# Patient Record
Sex: MALE | Race: WHITE | Employment: OTHER | URBAN - METROPOLITAN AREA
[De-identification: names, ages, dates, MRNs, and addresses within clinical notes are randomized per-mention and may not be internally consistent; named-entity substitution may affect disease eponyms.]

---

## 2017-05-30 ENCOUNTER — HOSPITAL ENCOUNTER (OUTPATIENT)
Dept: GENERAL RADIOLOGY | Age: 77
Discharge: HOME | End: 2017-05-30
Attending: FAMILY MEDICINE
Payer: MEDICARE

## 2017-05-30 DIAGNOSIS — R13.10 DIFFICULTY IN SWALLOWING: ICD-10-CM

## 2017-05-30 DIAGNOSIS — R05.9 COUGH: ICD-10-CM

## 2017-05-30 DIAGNOSIS — R13.13 PHARYNGEAL DYSPHAGIA: ICD-10-CM

## 2017-05-30 PROCEDURE — 74230 X-RAY XM SWLNG FUNCJ C+: CPT

## 2017-05-30 PROCEDURE — G8996 SWALLOW CURRENT STATUS: HCPCS

## 2017-05-30 PROCEDURE — G8997 SWALLOW GOAL STATUS: HCPCS

## 2017-05-30 PROCEDURE — G8998 SWALLOW D/C STATUS: HCPCS

## 2017-05-30 PROCEDURE — 92611 MOTION FLUOROSCOPY/SWALLOW: CPT

## 2017-05-30 NOTE — PROGRESS NOTES
[address] [place] [address]    Speech Pathology Modified barium swallow Study  Patient: Yenni Merida (89 y.o. male)  Date: 5/30/2017  Referring Provider:  Dr. Hendrick Kocher:   Patient alert. Patient reports gagging/feeling like he is drowning on his own saliva. Reports it feels like saliva goes down his windpipe instead of his esophagus. Reports this occurs daily, and has been occurring \"for a while\" but is more common lately. Reports this rarely happens with PO intake. PMH includes chest pain and arm numbness recently with WNL EKG x2 with ultimate stent placed due to 85% blockage per patient report. Also with hypothermia and reported heart attack, however patient does not believe he had a heart attack. 15-20 years ago, patient with throat spasms which subsided with stretching esophagus and medication, however patient does not remember the name of the medication (he still takes it). Also reports PNA 30-35 years ago. Reports no difficulty with pills. Also reports globus sensation when pointing laterally around the thyroid notch that will \"come and go\" and is not alleviated by anything. OBJECTIVE:   Past Medical History:   No past medical history on file. No past surgical history on file. Current Dietary Status:  Regular/thin liquid  Radiologist: Dr. Aaron Mc: Lateral;Fluoro  Patient Position: standing upright    Trial 1:   Consistency Presented: Thin liquid;Puree; Solid;Pill/Tablet   How Presented: Self-fed/presented;Spoon;Cup/sip;Cup/gulp;Straw;Successive swallows       Bolus Acceptance: No impairment   Bolus Formation/Control: No impairment:     Propulsion: No impairment   Oral Residue: None   Initiation of Swallow: Triggered at vallecula (spillage to pyriform sinuses)   Timing: Pooling 1-5 sec (~1 second)   Penetration: None   Aspiration/Timing: No evidence of aspiration   Pharyngeal Clearance: Vallecular residue;Pyriform residue ; Less than 10%   Attempted Modifications: Double swallow   Effective Modifications: Double swallow   Cues for Modifications: None           Decreased Tongue Base Retraction?: Yes  Laryngeal Elevation: Inadequate epiglottic inversion  Aspiration/Penetration Score: 1 (No penetration or aspiration-Contrast does not enter the airway)  Pharyngeal Symmetry: Not assessed  Pharyngeal-Esophageal Segment: Decreased relaxation of upper esophageal segment  Pharyngeal Dysfunction: Crico-pharyngeal dysfunction;Decreased tongue base retraction;Decreased strength    Oral Phase Severity: No impairment  Pharyngeal Phase Severity: Mild (but functional)     In compliance with CMSs Claims Based Outcome Reporting, the following G-code set was chosen for this patient based the use of the NOMS functional outcome to quantify this patient's level of swallowing impairment. Using the NOMS, the patient was determined to be at level 6 for swallow function which correlates with the CI= 1-19% level of severity. Based on the objective assessment provided within this note, the current, goal, and discharge g-codes are as follows:    Swallow  Swallowing:   Swallow Current Status CI= 1-19%   Swallow Goal Status CI= 1-19%   Swallow D/C Status CI= 1-19%        NOMS Swallowing Levels:  Level 1 (CN): NPO  Level 2 (CM): NPO but takes consistency in therapy  Level 3 (CL): Takes less than 50% of nutrition p.o. and continues with nonoral feedings; and/or safe with mod cues; and/or max diet restriction  Level 4 (CK): Safe swallow but needs mod cues; and/or mod diet restriction; and/or still requires some nonoral feeding/supplements  Level 5 (CJ): Safe swallow with min diet restriction; and/or needs min cues  Level 6 (CI): Independent with p.o.; rare cues; usually self cues; may need to avoid some foods or needs extra time  Level 7 ([address] [place] [address]): Independent for all p.o.  MELITA. (2003).  National Outcomes Measurement System (NOMS): Adult Speech-Language Pathology User's Guide. ASSESSMENT :  Based on the objective data described above, the patient presents with intact oral and mild pharyngeal dysphagia that is overall functional. Patient with mildly reduced tongue base retraction, anterior hyoid excursion, epiglottic inversion, and PES opening. This resulted in trace/mild vallecular and pyriform sinus residue. This also resulted in stasis of Barium tablet in the valleculae which resolved with subsequent additional swallows. Patient also with intermittently mildly delayed swallow initiation with pooling in the valleculae for ~1 second and spillage to the pyriform sinuses. No penetration or aspiration observed. Extensive education provided to patient regarding results of MBS. Patient with extensive questions and reported this test does not provide answers for why he is choking on his saliva. Provided education that this can explain globus sensation, however patient was not satisfied with this. Possible that choking on saliva is related to mildly delayed swallow initiation. Overall, swallow is functional.     PLAN/RECOMMENDATIONS :  --Soft solid/thin liquid diet  --Extra moisture with PO intake  --full 8 oz thin liquid with medication  --No further SLP treatment indicated     COMMUNICATION/EDUCATION:   The above findings and recommendations were discussed with: patient at length who verbalized understanding.     Thank you for this referral.  Alessandro Tee, SLP  Time Calculation: 40 mins